# Patient Record
(demographics unavailable — no encounter records)

---

## 2025-01-14 NOTE — PHYSICAL EXAM
[Normal] : mucosa is normal [Midline] : trachea located in midline position [de-identified] : ANGELICA IMPACTED CERUMEN REMOVED/ LEFT CANAL IRRITATED

## 2025-01-14 NOTE — REVIEW OF SYSTEMS
[Sneezing] : sneezing [Seasonal Allergies] : seasonal allergies [Post Nasal Drip] : post nasal drip [Ear Noises] : ear noises [Wheezing] : wheezing [Cough] : cough [Easy Bruising] : tendency for easy bruising [Negative] : Endocrine [Patient Intake Form Reviewed] : Patient intake form was reviewed [FreeTextEntry6] : noisy breathing  [FreeTextEntry1] : daytime sleepiness

## 2025-01-14 NOTE — HISTORY OF PRESENT ILLNESS
[de-identified] : EARS CLOGGED FEELING / DISCOMFORT X 2 WEEKS FOLLOWING USING [facility] HX REVIEWED WORKS IN CONSTRUCTION

## 2025-02-05 NOTE — ASSESSMENT
[FreeTextEntry1] : In view of his age he is certainly a candidate for screening colonoscopy which will be scheduled.  He will undergo a follow-up basic metabolic profile. The risks, benefits, complications and possible adverse consequences associated with colonoscopy were discussed with the patient.

## 2025-02-05 NOTE — PHYSICAL EXAM
[Alert] : alert [Normal Voice/Communication] : normal voice/communication [Healthy Appearing] : healthy appearing [No Acute Distress] : no acute distress [Sclera] : the sclera and conjunctiva were normal [Hearing Threshold Finger Rub Not [place]] : hearing was normal [Normal Lips/Gums] : the lips and gums were normal [Oropharynx] : the oropharynx was normal [Normal Appearance] : the appearance of the neck was normal [No Respiratory Distress] : no respiratory distress [No Acc Muscle Use] : no accessory muscle use [Respiration, Rhythm And Depth] : normal respiratory rhythm and effort [Auscultation Breath Sounds / Voice Sounds] : lungs were clear to auscultation bilaterally [Heart Rate And Rhythm] : heart rate was normal and rhythm regular [Normal S1, S2] : normal S1 and S2 [Murmurs] : no murmurs [Bowel Sounds] : normal bowel sounds [Abdomen Tenderness] : non-tender [No Masses] : no abdominal mass palpated [Abdomen Soft] : soft [] : no hepatosplenomegaly [Oriented To Time, Place, And Person] : oriented to person, place, and time

## 2025-02-05 NOTE — HISTORY OF PRESENT ILLNESS
[FreeTextEntry1] : The patient is referred for routine colon cancer screening.  He has never undergone a colonoscopy.  He denies any abdominal pain, nausea, vomiting, diarrhea or constipation.  There is no family history of colon cancer.  There is no rectal bleeding.  His appetite and weight are stable.  Routine blood test in November 2024 revealed a CO2 of 16 with an anion gap of 25.  The electrolytes, BUN, creatinine and liver function test were otherwise normal.

## 2025-05-21 NOTE — PHYSICAL EXAM
[Alert] : alert [Normal Voice/Communication] : normal voice/communication [Healthy Appearing] : healthy appearing [No Acute Distress] : no acute distress [Sclera] : the sclera and conjunctiva were normal [Hearing Threshold Finger Rub Not [facility]] : hearing was normal [Normal Lips/Gums] : the lips and gums were normal [Oropharynx] : the oropharynx was normal [Normal Appearance] : the appearance of the neck was normal [No Respiratory Distress] : no respiratory distress [No Acc Muscle Use] : no accessory muscle use [Respiration, Rhythm And Depth] : normal respiratory rhythm and effort [Auscultation Breath Sounds / Voice Sounds] : lungs were clear to auscultation bilaterally [Heart Rate And Rhythm] : heart rate was normal and rhythm regular [Normal S1, S2] : normal S1 and S2 [Murmurs] : no murmurs [Bowel Sounds] : normal bowel sounds [Abdomen Tenderness] : non-tender [No Masses] : no abdominal mass palpated [Abdomen Soft] : soft [] : no hepatosplenomegaly [Oriented To Time, Place, And Person] : oriented to person, place, and time